# Patient Record
Sex: MALE | Race: OTHER | NOT HISPANIC OR LATINO | URBAN - METROPOLITAN AREA
[De-identification: names, ages, dates, MRNs, and addresses within clinical notes are randomized per-mention and may not be internally consistent; named-entity substitution may affect disease eponyms.]

---

## 2024-06-11 ENCOUNTER — EMERGENCY (EMERGENCY)
Facility: HOSPITAL | Age: 34
LOS: 1 days | Discharge: HOME | End: 2024-06-11
Attending: EMERGENCY MEDICINE
Payer: COMMERCIAL

## 2024-06-11 VITALS
WEIGHT: 182.1 LBS | TEMPERATURE: 99 F | OXYGEN SATURATION: 100 % | HEART RATE: 63 BPM | DIASTOLIC BLOOD PRESSURE: 78 MMHG | HEIGHT: 67.5 IN | RESPIRATION RATE: 17 BRPM | SYSTOLIC BLOOD PRESSURE: 119 MMHG

## 2024-06-11 LAB — S PYO AG SPEC QL IA: NEGATIVE — SIGNIFICANT CHANGE UP

## 2024-06-11 PROCEDURE — 87081 CULTURE SCREEN ONLY: CPT

## 2024-06-11 PROCEDURE — 99284 EMERGENCY DEPT VISIT MOD MDM: CPT

## 2024-06-11 PROCEDURE — 87880 STREP A ASSAY W/OPTIC: CPT

## 2024-06-11 PROCEDURE — 99283 EMERGENCY DEPT VISIT LOW MDM: CPT

## 2024-06-11 RX ORDER — LORATADINE 10 MG/1
10 TABLET ORAL DAILY
Refills: 0 | Status: DISCONTINUED | OUTPATIENT
Start: 2024-06-11 | End: 2024-06-14

## 2024-06-11 RX ORDER — DEXAMETHASONE 0.5 MG/5ML
10 ELIXIR ORAL ONCE
Refills: 0 | Status: COMPLETED | OUTPATIENT
Start: 2024-06-11 | End: 2024-06-11

## 2024-06-11 RX ADMIN — Medication 300 MILLIGRAM(S): at 12:01

## 2024-06-11 RX ADMIN — LORATADINE 10 MILLIGRAM(S): 10 TABLET ORAL at 12:01

## 2024-06-11 RX ADMIN — Medication 10 MILLIGRAM(S): at 12:01

## 2024-06-11 NOTE — ED PROVIDER NOTE - PHYSICAL EXAMINATION
Const: Awake, alert, no acute distress.  Well appearing.  Moving comfortably on stretcher.  HEENT: NC/AT.  Moist mucous membranes.  (+) pharyngeal erythema, (+) uvular edema, no exudates.  no cervical lymphadenopathy.  Eyes: Extraocular movements intact b/l.  Pupils equal, round, and reactive to light b/l.  Conjunctiva pink.  No scleral icterus.  Neck: Neck supple, full ROM without pain.  Cardiac: Regular rate and regular rhythm. S1 S2 present.  Peripheral pulses 2+ and symmetric.  No LE edema.  No chest wall tenderness, no overlying skin changes.  Resp: Speaking in full sentences. No evidence of respiratory distress.  Good air entry b/l, breath sounds clear to auscultation b/l.  Abd: Non-distended, no overlying skin changes.  Soft, non-tender, no guarding, no rigidity, no rebound tenderness.  No palpable masses.  MSK: Spine midline and non-tender, no paraspinal tenderness, no palpable deformities or overlying skin changes or open wounds. No CVAT.  Skin: Normal coloration.  No rashes, abrasions or lacerations.  Neuro: Awake, alert & oriented x 3.  Moves all extremities spontaneously and symmetrically.  No focal deficits. Const: Awake, alert, no acute distress.  Well appearing.  Moving comfortably on stretcher.  HEENT: NC/AT.  Moist mucous membranes.  (+) pharyngeal erythema, (+) uvular edema, no exudates.  no cervical lymphadenopathy.  Eyes: Extraocular movements intact b/l.  Pupils equal, round, and reactive to light b/l.  Conjunctiva pink.  No scleral icterus.  Neck: Neck supple, full ROM without pain.  Cardiac: Regular rate and regular rhythm. S1 S2 present.  Peripheral pulses 2+ and symmetric.  No LE edema.  No chest wall tenderness, no overlying skin changes.  Resp: Speaking in full sentences. No evidence of respiratory distress.  Good air entry b/l, breath sounds clear to auscultation b/l.  Abd: Non-distended, Soft, non-tender, no guarding, no rigidity, no rebound tenderness.  No palpable masses.  MSK: Spine midline and non-tender, no paraspinal tenderness, no palpable deformities or overlying skin changes or open wounds. No CVAT.  Skin: Normal coloration.  No rashes, abrasions or lacerations.  Neuro: Awake, alert & oriented x 3.  Moves all extremities spontaneously and symmetrically.  No focal deficits.

## 2024-06-11 NOTE — ED PROVIDER NOTE - OBJECTIVE STATEMENT
34-year-old M patient with no significant medical history presenting for evaluation of 1 month of throat pain.  Patient describes 1 month of daily throat pain, mild cough, and pain with swallowing.  Patient is scared, notes straining his voice for the past month.  Did not take any pain medications today.  No recent travel, no known sick contacts.  Denies fevers, chills, chest pain, shortness of breath, nasal congestion, nausea, vomiting, diarrhea, headaches, ear pain, vision changes.  Pt's primary language is not english, official medical translation services were offered however pt preferred to communicate in english and with the help of kris present at the bedside to aid in translation.

## 2024-06-11 NOTE — ED ADULT NURSE NOTE - CAS TRG GEN SKIN COLOR
Pre-procedure call complete.  Pt instructed not to eat or drink anything after midnight the night before procedure.  Pt aware will need someone to provide transport home and monitor pt 8 hours post procedure.  No driving for at least 24 hours after procedure.   Patient advised to take anti-rejections medications with a sip of water morning of procedure.  Patient verbalized aware of which medications to take.  Do not take sleep medication (including OTC) the night before procedure.  Arrival time and location given.  Expected length of stay reviewed.  Covid screening completed.  Pt verbalized understanding of all pre-procedure instructions.     Normal for race

## 2024-06-11 NOTE — ED PROVIDER NOTE - PATIENT PORTAL LINK FT
You can access the FollowMyHealth Patient Portal offered by [facility] by registering at the following website: [email or web address]. By joining [facility]’s FollowMyHealth portal, you will also be able to view your health information using other applications (apps) compatible with our system.

## 2024-06-11 NOTE — ED PROVIDER NOTE - NSFOLLOWUPCLINICS_GEN_ALL_ED_FT
[facility] - ENT  Otolaryngology (ENT)  [address] [place] [address]  Phone: [phone]  Fax:

## 2024-06-11 NOTE — ED PROVIDER NOTE - NSFOLLOWUPINSTRUCTIONS_ED_ALL_ED_FT
WE SENT A PRESCRIPTION FOR CLINDAMYCIN (ANTIBIOTIC) TO YOUR PHARMACY, PLEASE TAKE 1 TABLET BY MOUTH EVERY 8 HOURS FOR THE NEXT 5 DAYS.    PLEASE USE OVER THE COUNTER LORATADINE (CLARITIN) 10MG ONCE DAILY FOR THE NEXT 1 WEEK.    You can take over the counter Tylenol up to 1000mg every 6 hours as needed for pain and/or over the counter Motrin up to 600mg every 6 hours as needed for pain, please follow the instructions on the bottle.    PLEASE FOLLOW UP WITH AN EAR, NOSE AND THROAT (ENT) DOCTOR FOR CONTINUED EVALUATION OF THROAT PAIN.  Please look out for a call from the hospital to arrange an appointment, if you do not receive a call, please use the phone number above to arrange your own appointment.    Please RETURN TO THE EMERGENCY DEPARTMENT OR SEEK IMMEDIATE MEDICAL ATTENTION if you experience any new or worsening symptoms, including but not limited to: fevers, chills, persistent nausea or vomiting or diarrhea, significant fatigue, significantly decreased physical activity, inability to stand or walk on your own, inability to hold down foods or fluids because of nausea or vomiting, fainting, severe headaches, vision changes, chest pain, shortness of breath, bloody urine, coughing up or throwing up blood, bloody stools, incontinence of urine or stool.

## 2024-06-11 NOTE — ED PROVIDER NOTE - CLINICAL SUMMARY MEDICAL DECISION MAKING FREE TEXT BOX
Derrick 34-year-old male no past medical history with sore throat times 3+ weeks patient complains of dysphagia with pain with both swallowing liquids and solids but able to tolerate both no drooling no pooling of secretions no chest pain no shortness of breath no abdominal pain no rashes patient denies any fever or chills patient denies smoking is a kilpatrick and complains of mild hoarseness of voice because it hurts to sing on examination hemodynamically stable no rash no organomegaly no murmur oropharynx enlarged midline uvula with erythema no exudate consistent with uvular hydrops possibilities include infective versus inflammatory versus allergic will treat with steroids and antibiotics as well as antihistamines and have patient follow-up with ENT this week

## 2024-06-19 PROBLEM — Z00.00 ENCOUNTER FOR PREVENTIVE HEALTH EXAMINATION: Status: ACTIVE | Noted: 2024-06-19

## 2024-06-24 ENCOUNTER — NON-APPOINTMENT (OUTPATIENT)
Age: 34
End: 2024-06-24

## 2024-06-25 ENCOUNTER — APPOINTMENT (OUTPATIENT)
Dept: OTOLARYNGOLOGY | Facility: CLINIC | Age: 34
End: 2024-06-25
Payer: COMMERCIAL

## 2024-06-25 VITALS — HEIGHT: 67 IN | WEIGHT: 182 LBS | BODY MASS INDEX: 28.56 KG/M2

## 2024-06-25 VITALS
RESPIRATION RATE: 17 BRPM | SYSTOLIC BLOOD PRESSURE: 108 MMHG | OXYGEN SATURATION: 97 % | HEART RATE: 78 BPM | DIASTOLIC BLOOD PRESSURE: 66 MMHG

## 2024-06-25 DIAGNOSIS — Z78.9 OTHER SPECIFIED HEALTH STATUS: ICD-10-CM

## 2024-06-25 DIAGNOSIS — R07.0 PAIN IN THROAT: ICD-10-CM

## 2024-06-25 PROCEDURE — 31575 DIAGNOSTIC LARYNGOSCOPY: CPT

## 2024-06-25 PROCEDURE — 99204 OFFICE O/P NEW MOD 45 MIN: CPT | Mod: 25

## 2024-06-25 RX ORDER — OMEPRAZOLE 40 MG/1
40 CAPSULE, DELAYED RELEASE ORAL DAILY
Qty: 30 | Refills: 5 | Status: ACTIVE | COMMUNITY
Start: 2024-06-25 | End: 1900-01-01

## 2024-06-25 RX ORDER — FAMOTIDINE 40 MG/1
40 TABLET, FILM COATED ORAL
Qty: 30 | Refills: 3 | Status: ACTIVE | COMMUNITY
Start: 2024-06-25 | End: 1900-01-01

## 2024-06-25 NOTE — HISTORY OF PRESENT ILLNESS
[de-identified] : CAROLINE EVANS  is a 34 year old man who presents to the [facility] with a chief complaint of throat pain. Rates as 7/10.  Was seen in [facility] ED on 6/11/24 at which time treated patient with one time dose of steroids and prescribed antibiotics but patient did not take antibiotics yet. Worse after singing or talking too much.  They have had pain in their throat for 1.5 months. States he is a kilpatrick and often has dry throat. Denies throat redness, throat soreness or white spots in throat.  They had a prior CT neck at outside institution. They deny prior smoking history. They deny prior history of alcoholism/alcohol abuse. Throat pain is NOT when swallowing solids or liquids. They deny weight loss in the past 3 months. They did not alter their diet because of this pain. They deny frequent ear pain.  Pain is not when chewing. They deny a prior swallow study in the past 1 year. They have seen the following types of providers for this problem: ER visit.  They have taken the following medications for this problem: antibiotics and steroids.   Doing or taking the following makes the pain better: cepacol spray Doing the following makes the pain worse: Unsure  Patient denies dysphagia, odynophagia, dyspnea, dysphonia, neck pain, coughing up blood, neck swelling and recent fevers

## 2024-06-25 NOTE — PROCEDURE
[de-identified] : Stroboscopic Laryngoscopy Procedure Note:  Indication:	Assess laryngeal biomechanics and vocal fold oscillation.  Description of Procedure:	Informed consent was verbally obtained from the patient prior to the procedure. The patient was seated in the clinic chair. Topical anesthesia was achieved by first spraying the nasal cavities with 4% lidocaine and nasal decongestant.   Findings:  Supraglottis: no masses or lesions  Glottis:    Structure:                        Right: crisp and shows no lesions or masses                        Left:  crisp and shows no lesions or masses                 Mobility:                        Right:  normal                        Left:  normal                Amplitude:                        Right:  normal                       Left:  normal                Closure: complete                 Wave symmetry:  symmetric  Subglottis: no masses or lesions within the visualized subglottis Visualized airway is widely patent.

## 2024-06-25 NOTE — REASON FOR VISIT
[Initial Evaluation] : an initial evaluation for [[employer] ] : provided by [employer]   [FreeTextEntry2] : throat pain.  [Interpreters_IDNumber] : [id number]  [Interpreters_FullName] : Franky  [TWNoteComboBox1] : [place]

## 2024-06-25 NOTE — ASSESSMENT
[FreeTextEntry1] : Assessment/Plan:  #1 Odynophonia   #2 Throat pain #3 Possible GERD  I do not have a good explanation for his current symptoms. I have recommended we start famotidine 40mg daily before breakfast and omeprazole 40mg before dinner.  The risks, benefits, and alternatives to care were discussed with the patient and understanding expressed.  I would want to see him back in 1 month if pain not resolved.